# Patient Record
Sex: FEMALE | Race: WHITE | URBAN - METROPOLITAN AREA
[De-identification: names, ages, dates, MRNs, and addresses within clinical notes are randomized per-mention and may not be internally consistent; named-entity substitution may affect disease eponyms.]

---

## 2022-06-17 ENCOUNTER — PATIENT MESSAGE (OUTPATIENT)
Dept: NEUROLOGY | Age: 72
End: 2022-06-17

## 2022-06-18 NOTE — TELEPHONE ENCOUNTER
From: Melania Hopson  To: Dr. Barker List: 6/17/2022 11:21 AM EDT  Subject: Sherrie Barker Rx Qty    The current Rx quantity is 2 whole pills a day, 1@ 6:30, 1/2 at 11AM, 1/2 at [address] [place] [address] for a total 180 tablets per order. At my last visit with you the dosage was increased to:  1 tablet 3 times a day at 1 at 6:30AM, 1 at 11:00, 1 at 4PM for a total of 270 tablets per order. Please adjust the prescription. Thank You.     Melania Hopson

## 2022-06-20 NOTE — TELEPHONE ENCOUNTER
Requested Prescriptions     Signed Prescriptions Disp Refills    carbidopa-levodopa (SINEMET)  MG per tablet 270 tablet 1     Sig: Take 1 tab at 6:30am, 1 tab at 11am and 1 tab at 4pm     Authorizing Provider: Miki Herrera     Ordering User: Marcela Mendoza

## 2022-12-07 DIAGNOSIS — G20 PARKINSON'S DISEASE (HCC): Primary | ICD-10-CM

## 2023-07-25 DIAGNOSIS — G20 PARKINSON'S DISEASE (HCC): Primary | ICD-10-CM

## 2023-07-26 RX ORDER — PRAMIPEXOLE DIHYDROCHLORIDE 0.25 MG/1
0.25 TABLET ORAL 3 TIMES DAILY
Qty: 270 TABLET | Refills: 1 | Status: SHIPPED | OUTPATIENT
Start: 2023-07-26